# Patient Record
Sex: MALE | Race: WHITE | Employment: OTHER | URBAN - METROPOLITAN AREA
[De-identification: names, ages, dates, MRNs, and addresses within clinical notes are randomized per-mention and may not be internally consistent; named-entity substitution may affect disease eponyms.]

---

## 2021-10-19 ENCOUNTER — HOSPITAL ENCOUNTER (OUTPATIENT)
Dept: CARDIAC CATH/INVASIVE PROCEDURES | Age: 86
Discharge: HOME | End: 2021-10-19
Attending: INTERNAL MEDICINE | Admitting: INTERNAL MEDICINE
Payer: MEDICARE

## 2021-10-19 ENCOUNTER — HOSPITAL ENCOUNTER (OUTPATIENT)
Dept: GENERAL RADIOLOGY | Age: 86
Discharge: HOME | End: 2021-10-21
Attending: INTERNAL MEDICINE
Payer: MEDICARE

## 2021-10-19 VITALS
TEMPERATURE: 98.1 F | HEIGHT: 74 IN | BODY MASS INDEX: 29.26 KG/M2 | DIASTOLIC BLOOD PRESSURE: 51 MMHG | OXYGEN SATURATION: 94 % | RESPIRATION RATE: 16 BRPM | WEIGHT: 228 LBS | HEART RATE: 60 BPM | SYSTOLIC BLOOD PRESSURE: 116 MMHG

## 2021-10-19 DIAGNOSIS — Z95.0 PACEMAKER: ICD-10-CM

## 2021-10-19 PROBLEM — Z45.018 ADJUSTMENT AND MANAGEMENT OF CARDIAC PACEMAKER: Status: ACTIVE | Noted: 2021-10-19

## 2021-10-19 LAB — GLUCOSE BLD-MCNC: 94 MG/DL (ref 75–110)

## 2021-10-19 PROCEDURE — 6360000002 HC RX W HCPCS

## 2021-10-19 PROCEDURE — G0378 HOSPITAL OBSERVATION PER HR: HCPCS

## 2021-10-19 PROCEDURE — 2709999900 HC NON-CHARGEABLE SUPPLY

## 2021-10-19 PROCEDURE — C1894 INTRO/SHEATH, NON-LASER: HCPCS

## 2021-10-19 PROCEDURE — 82947 ASSAY GLUCOSE BLOOD QUANT: CPT

## 2021-10-19 PROCEDURE — G0379 DIRECT REFER HOSPITAL OBSERV: HCPCS

## 2021-10-19 PROCEDURE — 2500000003 HC RX 250 WO HCPCS

## 2021-10-19 PROCEDURE — 6360000002 HC RX W HCPCS: Performed by: INTERNAL MEDICINE

## 2021-10-19 PROCEDURE — 33216 INSERT 1 ELECTRODE PM-DEFIB: CPT | Performed by: INTERNAL MEDICINE

## 2021-10-19 PROCEDURE — 71045 X-RAY EXAM CHEST 1 VIEW: CPT

## 2021-10-19 PROCEDURE — C1898 LEAD, PMKR, OTHER THAN TRANS: HCPCS

## 2021-10-19 PROCEDURE — 2580000003 HC RX 258: Performed by: INTERNAL MEDICINE

## 2021-10-19 RX ORDER — GLIMEPIRIDE 1 MG/1
1 TABLET ORAL
COMMUNITY

## 2021-10-19 RX ORDER — SODIUM CHLORIDE 9 MG/ML
25 INJECTION, SOLUTION INTRAVENOUS PRN
Status: DISCONTINUED | OUTPATIENT
Start: 2021-10-19 | End: 2021-10-19 | Stop reason: HOSPADM

## 2021-10-19 RX ORDER — UBIDECARENONE 75 MG
50 CAPSULE ORAL DAILY
COMMUNITY

## 2021-10-19 RX ORDER — DONEPEZIL HYDROCHLORIDE 10 MG/1
10 TABLET, FILM COATED ORAL DAILY
COMMUNITY

## 2021-10-19 RX ORDER — LEVOTHYROXINE SODIUM 0.05 MG/1
50 TABLET ORAL DAILY
COMMUNITY

## 2021-10-19 RX ORDER — METOPROLOL SUCCINATE 25 MG/1
25 TABLET, EXTENDED RELEASE ORAL DAILY
COMMUNITY

## 2021-10-19 RX ORDER — ACETAMINOPHEN 325 MG/1
650 TABLET ORAL EVERY 6 HOURS PRN
COMMUNITY

## 2021-10-19 RX ORDER — SODIUM CHLORIDE 9 MG/ML
INJECTION, SOLUTION INTRAVENOUS CONTINUOUS
Status: DISCONTINUED | OUTPATIENT
Start: 2021-10-19 | End: 2021-10-19 | Stop reason: HOSPADM

## 2021-10-19 RX ORDER — SODIUM CHLORIDE 0.9 % (FLUSH) 0.9 %
5-40 SYRINGE (ML) INJECTION PRN
Status: DISCONTINUED | OUTPATIENT
Start: 2021-10-19 | End: 2021-10-19 | Stop reason: HOSPADM

## 2021-10-19 RX ORDER — SODIUM CHLORIDE 0.9 % (FLUSH) 0.9 %
5-40 SYRINGE (ML) INJECTION EVERY 12 HOURS SCHEDULED
Status: DISCONTINUED | OUTPATIENT
Start: 2021-10-19 | End: 2021-10-19 | Stop reason: HOSPADM

## 2021-10-19 RX ORDER — ASPIRIN 81 MG/1
81 TABLET ORAL DAILY
COMMUNITY

## 2021-10-19 RX ORDER — LISINOPRIL 5 MG/1
5 TABLET ORAL DAILY
COMMUNITY

## 2021-10-19 RX ADMIN — VANCOMYCIN HYDROCHLORIDE 1500 MG: 5 INJECTION, POWDER, LYOPHILIZED, FOR SOLUTION INTRAVENOUS at 17:45

## 2021-10-19 ASSESSMENT — PAIN - FUNCTIONAL ASSESSMENT: PAIN_FUNCTIONAL_ASSESSMENT: 0-10

## 2021-10-19 ASSESSMENT — PAIN DESCRIPTION - DESCRIPTORS: DESCRIPTORS: ACHING

## 2021-10-19 NOTE — DISCHARGE INSTR - ACTIVITY
Below are instructions for you to follow to care for your wound. Look at (or have someone look at) the dressing daily. Leave dressing in place until follow up appointment, the physician will remove the dressing at your appointment. Watch for infection (drainage, swelling, redness, warmth, or increased pain)    Notify your doctor if your temperature is 99 degrees or higher. Call your physician if you have concerns about your wound, such as infection (listed above) or if the edges separate. Sutures will dissolve on their own. If Steri-strips are used and do not come off on their own in 7-10 days, you may gently remove them in the shower while they are moist. Do not force them off. Dermabond is a sterile, liquid, skin adhesive that may be used to hold the wound edges together. The film will usually remain in place for 5-10 days, then naturally falls off your skin. Do not place tape directly over the adhesive film because removing the tape may also remove the film. Do not scratch or rub the wound. Do not apply any ointments, creams or lotions on the wound until it is completely healed. Cover the wound with gauze if it rubs on clothing and causes discomfort. Replace the dressing daily. You may take a shower 72 hours (3 DAYS)  after implant. Turn your back to the water nozzle for A FULL 7-10 days to avoid direct water pressure on wound. The wound may be washed gently with soap and water after 72 hours. Do not soak or scrub your wound. After showering, gently blot your wound dry with a soft towel. You may take a tub bath, but keep the wound above the water level in the tub until the scab is gone (7-10 days). Below are restrictions of arm movements:  Do not raise elbow on the operated side above the shoulder for 6 weeks. Use arm sling as a reminder.     Use your arm on the operated side, but do not make extreme movements (such as stretching or reaching for a heavy object) for 6 weeks. Do not lift greater than 7 pounds (gallon of milk) with the arm on the operated side for 6 weeks.

## 2021-10-19 NOTE — PROGRESS NOTES
Pt arrived to room 2036, via bed, from cath lab s/p pacemaker revision. Pt to lay flat for 1 hour. Bed in lowest position, call light within reach. Diet order may be placed. Vitals as charted with no s/s of distress at this time. Family at bedside. Pt does have Dementia, needs frequent reminder of why he is here and to not raise his L arm d/t pacemaker revision.

## 2021-10-19 NOTE — PROGRESS NOTES
Pt and daughter at bedside, pt discharged with all belongings and discharge instructions at this time. Pt's daughter & pt both verbalize understanding of d/c instructions and follow up appointments. [facility], in private vehicle at this time.

## 2021-10-19 NOTE — PROGRESS NOTES
RN spoke with Dr. Constantino Horton, he is okay for discharge tonight. Verbal order given, RN to place. Pt will be going back to his assisted living, daughter was a nurse and will be caring for him.